# Patient Record
Sex: MALE | Race: BLACK OR AFRICAN AMERICAN | Employment: OTHER | URBAN - METROPOLITAN AREA
[De-identification: names, ages, dates, MRNs, and addresses within clinical notes are randomized per-mention and may not be internally consistent; named-entity substitution may affect disease eponyms.]

---

## 2017-11-06 ENCOUNTER — ANESTHESIA EVENT (OUTPATIENT)
Dept: SURGERY | Age: 32
End: 2017-11-06
Payer: SELF-PAY

## 2017-11-06 ENCOUNTER — APPOINTMENT (OUTPATIENT)
Dept: GENERAL RADIOLOGY | Age: 32
End: 2017-11-06
Attending: ORTHOPAEDIC SURGERY
Payer: SELF-PAY

## 2017-11-06 ENCOUNTER — ANESTHESIA (OUTPATIENT)
Dept: SURGERY | Age: 32
End: 2017-11-06
Payer: SELF-PAY

## 2017-11-06 ENCOUNTER — HOSPITAL ENCOUNTER (OUTPATIENT)
Age: 32
Setting detail: OBSERVATION
Discharge: HOME | End: 2017-11-07
Attending: EMERGENCY MEDICINE | Admitting: ORTHOPAEDIC SURGERY
Payer: SELF-PAY

## 2017-11-06 ENCOUNTER — APPOINTMENT (OUTPATIENT)
Dept: GENERAL RADIOLOGY | Age: 32
End: 2017-11-06
Attending: PHYSICIAN ASSISTANT
Payer: SELF-PAY

## 2017-11-06 DIAGNOSIS — S60.552A ACUTE FOREIGN BODY OF LEFT HAND, INITIAL ENCOUNTER: Primary | ICD-10-CM

## 2017-11-06 DIAGNOSIS — Z23 NEED FOR TDAP VACCINATION: ICD-10-CM

## 2017-11-06 PROBLEM — M79.5 FOREIGN BODY (FB) IN SOFT TISSUE: Status: ACTIVE | Noted: 2017-11-06

## 2017-11-06 LAB
ALBUMIN SERPL-MCNC: 4.2 G/DL (ref 3.4–5)
ALBUMIN/GLOB SERPL: 1.1 {RATIO} (ref 0.8–1.7)
ALP SERPL-CCNC: 91 U/L (ref 45–117)
ALT SERPL-CCNC: 33 U/L (ref 16–61)
ANION GAP SERPL CALC-SCNC: 10 MMOL/L (ref 3–18)
APTT PPP: 29 SEC (ref 23–36.4)
AST SERPL-CCNC: 18 U/L (ref 15–37)
BASOPHILS # BLD: 0 K/UL (ref 0–0.06)
BASOPHILS NFR BLD: 0 % (ref 0–2)
BILIRUB SERPL-MCNC: 0.4 MG/DL (ref 0.2–1)
BUN SERPL-MCNC: 12 MG/DL (ref 7–18)
BUN/CREAT SERPL: 9 (ref 12–20)
CALCIUM SERPL-MCNC: 8.9 MG/DL (ref 8.5–10.1)
CHLORIDE SERPL-SCNC: 103 MMOL/L (ref 100–108)
CO2 SERPL-SCNC: 30 MMOL/L (ref 21–32)
CREAT SERPL-MCNC: 1.31 MG/DL (ref 0.6–1.3)
DIFFERENTIAL METHOD BLD: ABNORMAL
EOSINOPHIL # BLD: 0.2 K/UL (ref 0–0.4)
EOSINOPHIL NFR BLD: 3 % (ref 0–5)
ERYTHROCYTE [DISTWIDTH] IN BLOOD BY AUTOMATED COUNT: 13.5 % (ref 11.6–14.5)
GLOBULIN SER CALC-MCNC: 3.8 G/DL (ref 2–4)
GLUCOSE SERPL-MCNC: 113 MG/DL (ref 74–99)
HCT VFR BLD AUTO: 41.2 % (ref 36–48)
HGB BLD-MCNC: 14.8 G/DL (ref 13–16)
INR PPP: 0.9 (ref 0.8–1.2)
LYMPHOCYTES # BLD: 2.2 K/UL (ref 0.9–3.6)
LYMPHOCYTES NFR BLD: 36 % (ref 21–52)
MCH RBC QN AUTO: 29.2 PG (ref 24–34)
MCHC RBC AUTO-ENTMCNC: 35.9 G/DL (ref 31–37)
MCV RBC AUTO: 81.4 FL (ref 74–97)
MONOCYTES # BLD: 0.5 K/UL (ref 0.05–1.2)
MONOCYTES NFR BLD: 9 % (ref 3–10)
NEUTS SEG # BLD: 3.1 K/UL (ref 1.8–8)
NEUTS SEG NFR BLD: 52 % (ref 40–73)
PLATELET # BLD AUTO: 271 K/UL (ref 135–420)
PMV BLD AUTO: 9.1 FL (ref 9.2–11.8)
POTASSIUM SERPL-SCNC: 3.7 MMOL/L (ref 3.5–5.5)
PROT SERPL-MCNC: 8 G/DL (ref 6.4–8.2)
PROTHROMBIN TIME: 11.9 SEC (ref 11.5–15.2)
RBC # BLD AUTO: 5.06 M/UL (ref 4.7–5.5)
SODIUM SERPL-SCNC: 143 MMOL/L (ref 136–145)
WBC # BLD AUTO: 6 K/UL (ref 4.6–13.2)

## 2017-11-06 PROCEDURE — 74011250636 HC RX REV CODE- 250/636

## 2017-11-06 PROCEDURE — 74011250636 HC RX REV CODE- 250/636: Performed by: ORTHOPAEDIC SURGERY

## 2017-11-06 PROCEDURE — 74011250636 HC RX REV CODE- 250/636: Performed by: ANESTHESIOLOGY

## 2017-11-06 PROCEDURE — 77030002916 HC SUT ETHLN J&J -A

## 2017-11-06 PROCEDURE — 73130 X-RAY EXAM OF HAND: CPT

## 2017-11-06 PROCEDURE — 90471 IMMUNIZATION ADMIN: CPT

## 2017-11-06 PROCEDURE — 99218 HC RM OBSERVATION: CPT

## 2017-11-06 PROCEDURE — 74011000250 HC RX REV CODE- 250

## 2017-11-06 PROCEDURE — 77030000032 HC CUF TRNQT ZIMM -B: Performed by: ORTHOPAEDIC SURGERY

## 2017-11-06 PROCEDURE — 76060000032 HC ANESTHESIA 0.5 TO 1 HR: Performed by: ORTHOPAEDIC SURGERY

## 2017-11-06 PROCEDURE — 74011000250 HC RX REV CODE- 250: Performed by: ORTHOPAEDIC SURGERY

## 2017-11-06 PROCEDURE — 96365 THER/PROPH/DIAG IV INF INIT: CPT

## 2017-11-06 PROCEDURE — 74011250636 HC RX REV CODE- 250/636: Performed by: PHYSICIAN ASSISTANT

## 2017-11-06 PROCEDURE — 85025 COMPLETE CBC W/AUTO DIFF WBC: CPT | Performed by: PHYSICIAN ASSISTANT

## 2017-11-06 PROCEDURE — 85610 PROTHROMBIN TIME: CPT | Performed by: PHYSICIAN ASSISTANT

## 2017-11-06 PROCEDURE — 80053 COMPREHEN METABOLIC PANEL: CPT | Performed by: PHYSICIAN ASSISTANT

## 2017-11-06 PROCEDURE — 90715 TDAP VACCINE 7 YRS/> IM: CPT | Performed by: PHYSICIAN ASSISTANT

## 2017-11-06 PROCEDURE — 77030018836 HC SOL IRR NACL ICUM -A

## 2017-11-06 PROCEDURE — 96375 TX/PRO/DX INJ NEW DRUG ADDON: CPT

## 2017-11-06 PROCEDURE — 99283 EMERGENCY DEPT VISIT LOW MDM: CPT

## 2017-11-06 PROCEDURE — 85730 THROMBOPLASTIN TIME PARTIAL: CPT | Performed by: PHYSICIAN ASSISTANT

## 2017-11-06 PROCEDURE — 76010000138 HC OR TIME 0.5 TO 1 HR: Performed by: ORTHOPAEDIC SURGERY

## 2017-11-06 PROCEDURE — 77030011640 HC PAD GRND REM COVD -A: Performed by: ORTHOPAEDIC SURGERY

## 2017-11-06 PROCEDURE — 76210000006 HC OR PH I REC 0.5 TO 1 HR: Performed by: ORTHOPAEDIC SURGERY

## 2017-11-06 PROCEDURE — 77030002916 HC SUT ETHLN J&J -A: Performed by: ORTHOPAEDIC SURGERY

## 2017-11-06 RX ORDER — NALOXONE HYDROCHLORIDE 0.4 MG/ML
0.1 INJECTION, SOLUTION INTRAMUSCULAR; INTRAVENOUS; SUBCUTANEOUS AS NEEDED
Status: DISCONTINUED | OUTPATIENT
Start: 2017-11-06 | End: 2017-11-06 | Stop reason: HOSPADM

## 2017-11-06 RX ORDER — ONDANSETRON 2 MG/ML
4 INJECTION INTRAMUSCULAR; INTRAVENOUS
Status: COMPLETED | OUTPATIENT
Start: 2017-11-06 | End: 2017-11-06

## 2017-11-06 RX ORDER — SODIUM CHLORIDE 0.9 % (FLUSH) 0.9 %
5-10 SYRINGE (ML) INJECTION AS NEEDED
Status: DISCONTINUED | OUTPATIENT
Start: 2017-11-06 | End: 2017-11-07 | Stop reason: HOSPADM

## 2017-11-06 RX ORDER — DEXTROSE 50 % IN WATER (D50W) INTRAVENOUS SYRINGE
25-50 AS NEEDED
Status: DISCONTINUED | OUTPATIENT
Start: 2017-11-06 | End: 2017-11-06 | Stop reason: HOSPADM

## 2017-11-06 RX ORDER — SODIUM CHLORIDE, SODIUM LACTATE, POTASSIUM CHLORIDE, CALCIUM CHLORIDE 600; 310; 30; 20 MG/100ML; MG/100ML; MG/100ML; MG/100ML
1000 INJECTION, SOLUTION INTRAVENOUS CONTINUOUS
Status: DISCONTINUED | OUTPATIENT
Start: 2017-11-06 | End: 2017-11-06 | Stop reason: HOSPADM

## 2017-11-06 RX ORDER — HYDROCODONE BITARTRATE AND ACETAMINOPHEN 5; 325 MG/1; MG/1
TABLET ORAL
Qty: 10 TAB | Refills: 0 | Status: SHIPPED | OUTPATIENT
Start: 2017-11-06

## 2017-11-06 RX ORDER — CEFAZOLIN SODIUM 2 G/50ML
2 SOLUTION INTRAVENOUS ONCE
Status: COMPLETED | OUTPATIENT
Start: 2017-11-06 | End: 2017-11-06

## 2017-11-06 RX ORDER — MAGNESIUM SULFATE 100 %
4 CRYSTALS MISCELLANEOUS AS NEEDED
Status: DISCONTINUED | OUTPATIENT
Start: 2017-11-06 | End: 2017-11-06 | Stop reason: HOSPADM

## 2017-11-06 RX ORDER — CEFAZOLIN SODIUM 2 G/50ML
2 SOLUTION INTRAVENOUS
Status: COMPLETED | OUTPATIENT
Start: 2017-11-06 | End: 2017-11-06

## 2017-11-06 RX ORDER — BUPIVACAINE HYDROCHLORIDE 2.5 MG/ML
INJECTION, SOLUTION EPIDURAL; INFILTRATION; INTRACAUDAL AS NEEDED
Status: DISCONTINUED | OUTPATIENT
Start: 2017-11-06 | End: 2017-11-06 | Stop reason: HOSPADM

## 2017-11-06 RX ORDER — FENTANYL CITRATE 50 UG/ML
INJECTION, SOLUTION INTRAMUSCULAR; INTRAVENOUS AS NEEDED
Status: DISCONTINUED | OUTPATIENT
Start: 2017-11-06 | End: 2017-11-06 | Stop reason: HOSPADM

## 2017-11-06 RX ORDER — MIDAZOLAM HYDROCHLORIDE 1 MG/ML
INJECTION, SOLUTION INTRAMUSCULAR; INTRAVENOUS AS NEEDED
Status: DISCONTINUED | OUTPATIENT
Start: 2017-11-06 | End: 2017-11-06 | Stop reason: HOSPADM

## 2017-11-06 RX ORDER — LIDOCAINE HYDROCHLORIDE 20 MG/ML
INJECTION, SOLUTION EPIDURAL; INFILTRATION; INTRACAUDAL; PERINEURAL AS NEEDED
Status: DISCONTINUED | OUTPATIENT
Start: 2017-11-06 | End: 2017-11-06 | Stop reason: HOSPADM

## 2017-11-06 RX ORDER — FENTANYL CITRATE 50 UG/ML
50 INJECTION, SOLUTION INTRAMUSCULAR; INTRAVENOUS ONCE
Status: COMPLETED | OUTPATIENT
Start: 2017-11-06 | End: 2017-11-06

## 2017-11-06 RX ORDER — INSULIN LISPRO 100 [IU]/ML
INJECTION, SOLUTION INTRAVENOUS; SUBCUTANEOUS ONCE
Status: DISCONTINUED | OUTPATIENT
Start: 2017-11-06 | End: 2017-11-06 | Stop reason: HOSPADM

## 2017-11-06 RX ORDER — KETOROLAC TROMETHAMINE 30 MG/ML
30 INJECTION, SOLUTION INTRAMUSCULAR; INTRAVENOUS
Status: DISCONTINUED | OUTPATIENT
Start: 2017-11-06 | End: 2017-11-07 | Stop reason: HOSPADM

## 2017-11-06 RX ORDER — LIDOCAINE HYDROCHLORIDE 10 MG/ML
INJECTION INFILTRATION; PERINEURAL AS NEEDED
Status: DISCONTINUED | OUTPATIENT
Start: 2017-11-06 | End: 2017-11-06 | Stop reason: HOSPADM

## 2017-11-06 RX ORDER — HYDROMORPHONE HYDROCHLORIDE 2 MG/ML
0.5 INJECTION, SOLUTION INTRAMUSCULAR; INTRAVENOUS; SUBCUTANEOUS
Status: DISCONTINUED | OUTPATIENT
Start: 2017-11-06 | End: 2017-11-06 | Stop reason: HOSPADM

## 2017-11-06 RX ORDER — SODIUM CHLORIDE, SODIUM LACTATE, POTASSIUM CHLORIDE, CALCIUM CHLORIDE 600; 310; 30; 20 MG/100ML; MG/100ML; MG/100ML; MG/100ML
INJECTION, SOLUTION INTRAVENOUS
Status: DISCONTINUED | OUTPATIENT
Start: 2017-11-06 | End: 2017-11-06 | Stop reason: HOSPADM

## 2017-11-06 RX ORDER — HYDROCODONE BITARTRATE AND ACETAMINOPHEN 5; 325 MG/1; MG/1
1 TABLET ORAL
Status: DISCONTINUED | OUTPATIENT
Start: 2017-11-06 | End: 2017-11-07 | Stop reason: HOSPADM

## 2017-11-06 RX ORDER — SODIUM CHLORIDE 0.9 % (FLUSH) 0.9 %
5-10 SYRINGE (ML) INJECTION EVERY 8 HOURS
Status: DISCONTINUED | OUTPATIENT
Start: 2017-11-06 | End: 2017-11-07 | Stop reason: HOSPADM

## 2017-11-06 RX ORDER — DIPHENHYDRAMINE HCL 25 MG
25 CAPSULE ORAL
Status: DISCONTINUED | OUTPATIENT
Start: 2017-11-06 | End: 2017-11-07 | Stop reason: HOSPADM

## 2017-11-06 RX ORDER — IBUPROFEN 200 MG
800 TABLET ORAL
Qty: 60 TAB | Refills: 1 | Status: SHIPPED | OUTPATIENT
Start: 2017-11-06

## 2017-11-06 RX ORDER — SODIUM CHLORIDE 0.9 % (FLUSH) 0.9 %
5-10 SYRINGE (ML) INJECTION AS NEEDED
Status: DISCONTINUED | OUTPATIENT
Start: 2017-11-06 | End: 2017-11-06 | Stop reason: HOSPADM

## 2017-11-06 RX ORDER — FLUMAZENIL 0.1 MG/ML
0.2 INJECTION INTRAVENOUS
Status: DISCONTINUED | OUTPATIENT
Start: 2017-11-06 | End: 2017-11-06 | Stop reason: HOSPADM

## 2017-11-06 RX ORDER — PROPOFOL 10 MG/ML
INJECTION, EMULSION INTRAVENOUS AS NEEDED
Status: DISCONTINUED | OUTPATIENT
Start: 2017-11-06 | End: 2017-11-06 | Stop reason: HOSPADM

## 2017-11-06 RX ORDER — ONDANSETRON 2 MG/ML
4 INJECTION INTRAMUSCULAR; INTRAVENOUS
Status: DISCONTINUED | OUTPATIENT
Start: 2017-11-06 | End: 2017-11-07 | Stop reason: HOSPADM

## 2017-11-06 RX ORDER — CEFAZOLIN SODIUM 2 G/50ML
2 SOLUTION INTRAVENOUS
Status: DISCONTINUED | OUTPATIENT
Start: 2017-11-06 | End: 2017-11-06 | Stop reason: SDUPTHER

## 2017-11-06 RX ORDER — CEPHALEXIN 250 MG/1
500 CAPSULE ORAL
Status: DISCONTINUED | OUTPATIENT
Start: 2017-11-06 | End: 2017-11-06 | Stop reason: CLARIF

## 2017-11-06 RX ADMIN — KETOROLAC TROMETHAMINE 30 MG: 30 INJECTION, SOLUTION INTRAMUSCULAR at 18:39

## 2017-11-06 RX ADMIN — ONDANSETRON 4 MG: 2 INJECTION INTRAMUSCULAR; INTRAVENOUS at 17:31

## 2017-11-06 RX ADMIN — SODIUM CHLORIDE, SODIUM LACTATE, POTASSIUM CHLORIDE, AND CALCIUM CHLORIDE 1000 ML: 600; 310; 30; 20 INJECTION, SOLUTION INTRAVENOUS at 21:46

## 2017-11-06 RX ADMIN — PROPOFOL 20 MG: 10 INJECTION, EMULSION INTRAVENOUS at 21:00

## 2017-11-06 RX ADMIN — SODIUM CHLORIDE, SODIUM LACTATE, POTASSIUM CHLORIDE, CALCIUM CHLORIDE: 600; 310; 30; 20 INJECTION, SOLUTION INTRAVENOUS at 20:30

## 2017-11-06 RX ADMIN — PROPOFOL 20 MG: 10 INJECTION, EMULSION INTRAVENOUS at 21:08

## 2017-11-06 RX ADMIN — CEFAZOLIN SODIUM 2 G: 2 SOLUTION INTRAVENOUS at 20:36

## 2017-11-06 RX ADMIN — SODIUM CHLORIDE 1000 ML: 900 INJECTION, SOLUTION INTRAVENOUS at 17:30

## 2017-11-06 RX ADMIN — TETANUS TOXOID, REDUCED DIPHTHERIA TOXOID AND ACELLULAR PERTUSSIS VACCINE, ADSORBED 0.5 ML: 5; 2.5; 8; 8; 2.5 SUSPENSION INTRAMUSCULAR at 16:07

## 2017-11-06 RX ADMIN — MIDAZOLAM HYDROCHLORIDE 2 MG: 1 INJECTION, SOLUTION INTRAMUSCULAR; INTRAVENOUS at 20:36

## 2017-11-06 RX ADMIN — FENTANYL CITRATE 50 MCG: 50 INJECTION, SOLUTION INTRAMUSCULAR; INTRAVENOUS at 20:43

## 2017-11-06 RX ADMIN — CEFAZOLIN SODIUM 2 G: 2 SOLUTION INTRAVENOUS at 17:38

## 2017-11-06 RX ADMIN — FENTANYL CITRATE 50 MCG: 50 INJECTION, SOLUTION INTRAMUSCULAR; INTRAVENOUS at 17:21

## 2017-11-06 RX ADMIN — PROPOFOL 50 MG: 10 INJECTION, EMULSION INTRAVENOUS at 20:51

## 2017-11-06 RX ADMIN — LIDOCAINE HYDROCHLORIDE 60 MG: 20 INJECTION, SOLUTION EPIDURAL; INFILTRATION; INTRACAUDAL; PERINEURAL at 20:51

## 2017-11-06 NOTE — ANESTHESIA PREPROCEDURE EVALUATION
Anesthetic History   No history of anesthetic complications            Review of Systems / Medical History  Patient summary reviewed, nursing notes reviewed and pertinent labs reviewed    Pulmonary  Within defined limits                 Neuro/Psych   Within defined limits           Cardiovascular  Within defined limits                Exercise tolerance: >4 METS     GI/Hepatic/Renal                Endo/Other  Within defined limits           Other Findings              Physical Exam    Airway             Cardiovascular               Dental         Pulmonary                 Abdominal  GI exam deferred       Other Findings            Anesthetic Plan    ASA: 1, emergent  Anesthesia type: MAC and general - backup - supraclavicular block          Induction: Intravenous  Anesthetic plan and risks discussed with: Patient      General, mac and block  discussed.  Since it is all soft tissue we will do mac

## 2017-11-06 NOTE — IP AVS SNAPSHOT
Summary of Care Report The Summary of Care report has been created to help improve care coordination. Users with access to [employer] or [address] (Web-based application) may access additional patient information including the Discharge Summary. If you are not currently a [address] user and need more information, please call the number listed below in the [place] [address] section and ask to be connected with Medical Records. Facility Information Name Address Phone [address] [place], [place] [address] [phone] Patient Information Patient Name Sex  Bertin Ellsworth ([id number]) Male 1985 Discharge Information Admitting Provider Service Area Unit Eugene Washington MD / [phone] [address] / [phone] Discharge Provider Discharge Date/Time Discharge Disposition Destination (none) 2017 (Pending) AHR (none) Patient Language Language ENGLISH [13] Hospital Problems as of 2017  Reviewed: 2017  7:08 PM by Candy Tejeda CRNA Class Noted - Resolved Last Modified POA Active Problems Need for Tdap vaccination  2017 - Present 2017 by Homero Martinez PA-C Unknown Entered by Homero Martinez PA-C Acute foreign body of left hand  2017 - Present 2017 by Homero Martinez PA-C Unknown Entered by Homero Martinez PA-C Foreign body (FB) in soft tissue  2017 - Present 2017 by Eugene Washington MD Unknown Entered by Eugene Washington MD  
  
Non-Hospital Problems as of 2017  Reviewed: 2017  7:08 PM by Candy Tejeda CRNA None You are allergic to the following No active allergies Current Discharge Medication List  
  
START taking these medications Dose & Instructions Dispensing Information Comments HYDROcodone-acetaminophen 5-325 mg per tablet Commonly known as:  NORCO  
 1-2 tabs by mouth every 4-6 hours as needed for pain Quantity:  10 Tab Refills:  0  
   
 ibuprofen 200 mg tablet Commonly known as:  MOTRIN Dose:  800 mg Take 4 Tabs by mouth every eight (8) hours as needed for Pain. Quantity:  60 Tab Refills:  1 Current Immunizations Name Date Tdap 2017 Surgery Information ID Date/Time Status Primary Surgeon All Procedures Location [id number] 2017 Unposted Lydia Flores MD FOREIGN BODY REMOVAL LEFT HAND W/C-ARM [facility] MAIN OR Follow-up Information Follow up With Details Comments Contact Info None   None (395) Patient stated that they have no PCP [facility] Patient Discharge Instructions Wayne Adlerviktoriya / [id number] : 1985 Admitted 2017 Discharged: 2017 IF YOU HAVE ANY PROBLEMS ONCE YOU ARE AT HOME CALL THE FOLLOWING NUMBERS:  
Main office number: [phone] Any issues call the office at [phone]. Take Home Medications · Resume your home medictions as directed · A prescription for pain medication has been given · It is important that you take the medication exactly as they are prescribed. · Keep your medication in the bottles provided by the pharmacist and keep a list of the medication names, dosages, and times to be taken in your wallet. · Do not take other medications without consulting your doctor. · Note:  If you have already received and/or filled a prescription for one or more of the medications you've received a prescription for when leaving the hospital, you may disregard the duplicate prescription. What to do at [facility] Resume your prehospital diet. If you have excessive nausea or vomitting call your doctor's office Elevate the hand Keep incision DRY. Leave surgical dressing in place for 7-10 days, then seek medical attention for suture removal.  You may place a large bandaid over the incision to keep covered and dry after 7 days. When to Call - Call if you have a temperature greater then 101 
- Unable to keep food down - Need a pain medication refill Information obtained by : 
I understand that if any problems occur once I am at home I am to contact my physician. I understand and acknowledge receipt of the instructions indicated above. Physician's or R.N.'s Signature                                                                  Date/Time Patient or Representative Signature                                                          Date/Time Chart Review Routing History No Routing History on File

## 2017-11-06 NOTE — Clinical Note
Patient Class[de-identified] Observation [847] Type of Bed: Orthopedics [13] Reason for Observation: retained FB in left hand Admitting Diagnosis: Acute foreign body of left hand, initial encounter [6969992] Admitting Diagnosis: Need for Tdap vaccination [7681323] Admitting Physician: Jolie Gregorio Attending Physician: Belinda Gauthier [[id number]]

## 2017-11-06 NOTE — ED NOTES
Pt resting in stretcher, surgery time pending. IV fluids and abx infusing. Pt reports pain as 2/10. Friend at bedside.

## 2017-11-06 NOTE — PROGRESS NOTES
Pt with foreign body in hand - unsuccessful attempts at removal by ED. To OR this evening for surgical removal.  Last po intake at noon per ED. Plan for surgery around 8 pm.  Full H&P to follow.

## 2017-11-06 NOTE — PROGRESS NOTES
1828Received client from ED in satisfactory condition. Client is A/O X 4. Client is calm and cooperative. Skin is warm , dry with small wound to palm of left hand and skin color is appropriate to race. Bibasilar breath sounds clear. Bowel sounds active . Abdomen is soft and non-tender. Client is NPO for surgery planned at 2000, pre op checklist done by ED nurse. Client has 20gauge IV present in [address] and running NS. Clients pain is 8/10 on 0-10 scale. Client oriented to call bell use as well as bed use. Call bell within reach. Bed in low position. Three side rails up. 1839 Pt state pain as 8/10. Pt received medication as per MAR. Potential medication side effects explained to patient, patient verbalizes understanding, opportunities for questions provided. Patient stable, no apparent distress at this time, bed in locked position, call bell within reach.

## 2017-11-06 NOTE — ED NOTES
Pt ready for IP transfer. Pt with underwear still on, pt verbalizes understanding to remove clothes prior to surgery. IV fluids abx infusing.

## 2017-11-06 NOTE — ROUTINE PROCESS
TRANSFER - IN REPORT:    Verbal report received from Jareth AGRN(name) on [employer]  being received from Emergency(unit) for ordered procedure      Report consisted of patients Situation, Background, Assessment and   Recommendations(SBAR). Information from the following report(s) SBAR, Kardex, MAR and Accordion was reviewed with the receiving nurse. Opportunity for questions and clarification was provided.

## 2017-11-06 NOTE — IP AVS SNAPSHOT
[address] [place] [id number] 
[phone] Patient: Danya Espinal MRN: [id number] JET:[id number] About your hospitalization You were admitted on:  2017 You last received care in the:  [facility] [address] You were discharged on:  2017 Why you were hospitalized Your primary diagnosis was:  Not on File Your diagnoses also included:  Need For Tdap Vaccination, Acute Foreign Body Of Left Hand, Foreign Body (Fb) In Soft Tissue Things You Need To Do (next 8 weeks) Follow up with None Where:  None (395) Patient stated that they have no PCP Discharge Orders None A check serafin indicates which time of day the medication should be taken. My Medications TAKE these medications as instructed Instructions Each Dose to Equal  
 Morning Noon Evening Bedtime HYDROcodone-acetaminophen 5-325 mg per tablet Commonly known as:  Ivstew Velasquezing Your last dose was: Your next dose is:    
   
   
 1-2 tabs by mouth every 4-6 hours as needed for pain  
     
   
   
   
  
 ibuprofen 200 mg tablet Commonly known as:  MOTRIN Your last dose was: Your next dose is: Take 4 Tabs by mouth every eight (8) hours as needed for Pain. 800 mg Where to Get Your Medications Information on where to get these meds will be given to you by the nurse or doctor. ! Ask your nurse or doctor about these medications HYDROcodone-acetaminophen 5-325 mg per tablet  
 ibuprofen 200 mg tablet Dis[facility] Instructions [facility] Patient Discharge Instructions Danya Espinal / [id number] : 1985 Admitted 2017 Discharged: 2017 IF YOU HAVE ANY PROBLEMS ONCE YOU ARE AT HOME CALL THE FOLLOWING NUMBERS:  
Main office number: [phone] Any issues call the office at [phone]. Take Home Medications · Resume your home medictions as directed · A prescription for pain medication has been given · It is important that you take the medication exactly as they are prescribed. · Keep your medication in the bottles provided by the pharmacist and keep a list of the medication names, dosages, and times to be taken in your wallet. · Do not take other medications without consulting your doctor. · Note:  If you have already received and/or filled a prescription for one or more of the medications you've received a prescription for when leaving the hospital, you may disregard the duplicate prescription. What to do at [facility] Resume your prehospital diet. If you have excessive nausea or vomitting call your doctor's office Elevate the hand Keep incision DRY. Leave surgical dressing in place for 7-10 days, then seek medical attention for suture removal.  You may place a large bandaid over the incision to keep covered and dry after 7 days. When to Call - Call if you have a temperature greater then 101 
- Unable to keep food down - Need a pain medication refill Information obtained by : 
I understand that if any problems occur once I am at home I am to contact my physician. I understand and acknowledge receipt of the instructions indicated above. Physician's or R.N.'s Signature                                                                  Date/Time Patient or Representative Signature                                                          Date/Time [facility] Announcement We are excited to announce that we are making your provider's discharge notes available to you in [employer]. You will see these notes when they are completed and signed by the physician that discharged you from your recent hospital stay. If you have any questions or concerns about any information you see in [employer], please call the Health Information Department where you were seen or reach out to your Primary Care Provider for more information about your plan of care. Introducing [facility]! Claudette Sandoval introduces [employer] patient portal. Now you can access parts of your medical record, email your doctor's office, and request medication refills online. 1. In your internet browser, go to [email or web address] 2. Click on the First Time User? Click Here link in the Sign In box. You will see the New Member Sign Up page. 3. Enter your [employer] Access Code exactly as it appears below. You will not need to use this code after youve completed the sign-up process. If you do not sign up before the expiration date, you must request a new code. · [employer] Access Code: [email or web address] Expires: 2/5/2018  7:49 AM 
 
4. Enter the last four digits of your Social Security Number (xxxx) and Date of Birth (mm/dd/yyyy) as indicated and click Submit. You will be taken to the next sign-up page. 5. Create a [employer] ID. This will be your [employer] login ID and cannot be changed, so think of one that is secure and easy to remember. 6. Create a [employer] password. You can change your password at any time. 7. Enter your Password Reset Question and Answer. This can be used at a later time if you forget your password. 8. Enter your e-mail address. You will receive e-mail notification when new information is available in [address] 9. Click Sign Up. You can now view and download portions of your medical record.  
10. Click the Download Summary menu link to download a portable copy of your medical information. If you have questions, please visit the Frequently Asked Questions section of the [employer] website. Remember, [employer] is NOT to be used for urgent needs. For medical emergencies, dial 911. Now available from your iPhone and Android! Unresulted Labs-Please follow up with your PCP about these lab tests Order Current Status [place] [facility] [employer] [employer] In process Providers Seen During Your Hospitalization Provider Specialty Primary office phone Paolo Wilder MD Emergency Medicine [phone] Kourtney Mari MD Orthopedic Surgery [phone] Immunizations Administered for This Admission Name Date Tdap 11/6/2017 Your Primary Care Physician (PCP) Primary Care Physician Office Phone Office Fax NONE ** None ** ** None ** You are allergic to the following No active allergies Recent Documentation Height Weight BMI Smoking Status 1.753 m 79.4 kg 25.84 kg/m2 Never Assessed Emergency Contacts Name Discharge Info Relation Home Work Mobile Tian Solis [2] Other Relative [6]   [phone] Patient Belongings The following personal items are in your possession at time of discharge: 
  Dental Appliances: None         Home Medications: None   Jewelry: None  Clothing: None    Other Valuables: None Please provide this summary of care documentation to your next provider. Signatures-by signing, you are acknowledging that this After Visit Summary has been reviewed with you and you have received a copy. Patient Signature:  ____________________________________________________________ Date:  ____________________________________________________________  
  
Crissy Hager Provider Signature:  ____________________________________________________________ Date:  ____________________________________________________________

## 2017-11-06 NOTE — IP AVS SNAPSHOT
[address] [address] 
[phone] Patient: Katherine Centeno MRN: [id number] ITT:[id number]/9290 My Medications TAKE these medications as instructed Instructions Each Dose to Equal  
 Morning Noon Evening Bedtime HYDROcodone-acetaminophen 5-325 mg per tablet Commonly known as:  Fatmata Nuñez Your last dose was: Your next dose is:    
   
   
 1-2 tabs by mouth every 4-6 hours as needed for pain  
     
   
   
   
  
 ibuprofen 200 mg tablet Commonly known as:  MOTRIN Your last dose was: Your next dose is: Take 4 Tabs by mouth every eight (8) hours as needed for Pain. 800 mg Where to Get Your Medications Information on where to get these meds will be given to you by the nurse or doctor. ! Ask your nurse or doctor about these medications HYDROcodone-acetaminophen 5-325 mg per tablet  
 ibuprofen 200 mg tablet

## 2017-11-06 NOTE — ROUTINE PROCESS
TRANSFER - OUT REPORT:    Verbal report given to LAUREN Ang RN on [address]  being transferred to [employer] for routine progression of care       Report consisted of patients Situation, Background, Assessment and   Recommendations(SBAR). Information from the following report(s) SBAR, ED Summary, [facility] and Recent Results was reviewed with the receiving nurse. Lines:   Peripheral IV 11/06/17 Right Antecubital (Active)   Site Assessment Clean, dry, & intact 11/6/2017  5:29 PM   Phlebitis Assessment 0 11/6/2017  5:29 PM   Infiltration Assessment 0 11/6/2017  5:29 PM   Dressing Status Clean, dry, & intact 11/6/2017  5:29 PM   Dressing Type Transparent 11/6/2017  5:29 PM   Hub Color/Line Status Patent; Flushed 11/6/2017  5:29 PM   Action Taken Blood drawn 11/6/2017  5:29 PM   Alcohol Cap Used Yes 11/6/2017  5:29 PM        Opportunity for questions and clarification was provided.       Patient transported with:   [employer]

## 2017-11-06 NOTE — ED NOTES
Spoke with Surgical who stated that pt's surgical time won't be until 2000 due to NPO status. Approval from nursing supervisor to admit pt to ortho until surgery.

## 2017-11-07 VITALS
WEIGHT: 175 LBS | OXYGEN SATURATION: 100 % | BODY MASS INDEX: 25.92 KG/M2 | TEMPERATURE: 98 F | SYSTOLIC BLOOD PRESSURE: 109 MMHG | RESPIRATION RATE: 16 BRPM | DIASTOLIC BLOOD PRESSURE: 73 MMHG | HEIGHT: 69 IN | HEART RATE: 63 BPM

## 2017-11-07 PROCEDURE — 99218 HC RM OBSERVATION: CPT

## 2017-11-07 PROCEDURE — 74011250637 HC RX REV CODE- 250/637: Performed by: ORTHOPAEDIC SURGERY

## 2017-11-07 RX ADMIN — HYDROCODONE BITARTRATE AND ACETAMINOPHEN 1 TABLET: 5; 325 TABLET ORAL at 05:11

## 2017-11-07 RX ADMIN — Medication 10 ML: at 06:00

## 2017-11-07 NOTE — DISCHARGE INSTRUCTIONS
[facility]    Patient Discharge Instructions    Baron Ortiz / [id number] : 1985    Admitted 2017 Discharged: 2017     IF YOU HAVE ANY PROBLEMS ONCE YOU ARE AT HOME CALL THE FOLLOWING NUMBERS:   Main office number: [phone]    Any issues call the office at [phone]. Take Home Medications     · Resume your home medictions as directed  · A prescription for pain medication has been given   · It is important that you take the medication exactly as they are prescribed. · Keep your medication in the bottles provided by the pharmacist and keep a list of the medication names, dosages, and times to be taken in your wallet. · Do not take other medications without consulting your doctor. · Note:  If you have already received and/or filled a prescription for one or more of the medications you've received a prescription for when leaving the hospital, you may disregard the duplicate prescription. What to do at [address] [place] [address] Ave your prehospital diet. If you have excessive nausea or vomitting call your doctor's office     Elevate the hand    Keep incision DRY. Leave surgical dressing in place for 7-10 days, then seek medical attention for suture removal.  You may place a large bandaid over the incision to keep covered and dry after 7 days. When to Call    - Call if you have a temperature greater then 101  - Unable to keep food down  - Need a pain medication refill     Information obtained by :  I understand that if any problems occur once I am at home I am to contact my physician. I understand and acknowledge receipt of the instructions indicated above.                                                                                                                                            Physician's or R.N.'s Signature                                                                  Date/Time Patient or Representative Signature                                                          Date/Time

## 2017-11-07 NOTE — ANESTHESIA POSTPROCEDURE EVALUATION
Post-Anesthesia Evaluation and Assessment    Cardiovascular Function/Vital Signs  Visit Vitals    /66    Pulse (!) 59    Temp 36.3 °C (97.4 °F)    Resp 9    Ht 5' 9\" (1.753 m)    Wt 79.4 kg (175 lb)    SpO2 100%    BMI 25.84 kg/m2       Patient is status post Procedure(s):  FOREIGN BODY REMOVAL LEFT HAND W/C-ARM. Nausea/Vomiting: Controlled. Postoperative hydration reviewed and adequate. Pain:  Pain Scale 1: Numeric (0 - 10) (11/06/17 2144)  Pain Intensity 1: 0 (11/06/17 2144)   Managed. Neurological Status:   Neuro (WDL): Within Defined Limits (11/06/17 2142)   At baseline. Mental Status and Level of Consciousness: Arousable. Pulmonary Status:   O2 Device: Room air (11/06/17 2142)   Adequate oxygenation and airway patent. Complications related to anesthesia: None    Post-anesthesia assessment completed. No concerns. Patient has met all discharge requirements.     Signed By: Candy Tejeda CRNA    November 6, 2017

## 2017-11-07 NOTE — PROGRESS NOTES
18- Assumed care of patient at this time. Report received from [address] [place] [address]., RN. Patient in bed. Pain 2/10. Call bell left within reach. 0800 - Patient in bed at this time. A/O x 4. Lungs clear, radial pulses present ,  abdomen soft and non-distended. Bowel sounds active, 20 G IV to left AC, INT at this time. No signs of phlebitis or infiltration noted. Patient up at martin, ambulating around room. Skin warm and dry  with  Gauze and ACE wrap to left hand CDI. Patient denies pain or discomfort at this time. Bed placed in lowest position, call bell within reach. 0164 This writer has reviewed discharge instructions with patient at this time. Patient has verbalized understanding. Patient was provided with care notes to include side effects of RX's. IV removed, patient tolerated well. Arm bands removed and shredded. AVS were reviewed with Lorne Fuentes RN.

## 2017-11-07 NOTE — DISCHARGE SUMMARY
[facility] [place] [address]     DISCHARGE SUMMARY     PATIENT: Garrett Lopez     MRN: [id number]   ADMIT DATE: 2017   BILLIN   DISCHARGE DATE:  17     ATTENDING: Shellie Pardo MD   DICTATING: Samy Saravia MD     ADMISSION DIAGNOSIS: Acute foreign body of left hand, initial encounter  Need for Tdap vaccination  Foreign body (FB) in soft tissue  FOREIGN BODY LEFT HAND    DISCHARGE DIAGNOSIS: Status post FOREIGN BODY LEFT HAND    HISTORY OF PRESENT ILLNESS: The patient is a 34y.o. year-old male   with left hand pain secondary to accident with a screw gun on the day of admission. Attempt in ED to removed the screw was unsuccessful. The patient has at this time opted for surgical intervention. PAST MEDICAL HISTORY: History reviewed. No pertinent past medical history. PAST SURGICAL HISTORY:   Past Surgical History:   Procedure Laterality Date    HX ORTHOPAEDIC      right hip replacement       ALLERGIES: No Known Allergies     CURRENT MEDICATIONS:  A list of medications prior to the time of admission include:  Prior to Admission medications    Medication Sig Start Date End Date Taking? Authorizing Provider   ibuprofen (MOTRIN) 200 mg tablet Take 4 Tabs by mouth every eight (8) hours as needed for Pain. 17  Yes Samy Saravia MD   HYDROcodone-acetaminophen [facility]) 5-325 mg per tablet 1-2 tabs by mouth every 4-6 hours as needed for pain 17  Yes Samy Saravia MD       FAMILY HISTORY: History reviewed. No pertinent family history.     SOCIAL HISTORY:   Social History     Social History    Marital status:      Spouse name: N/A    Number of children: N/A    Years of education: N/A     Social History Main Topics    Smoking status: None    Smokeless tobacco: None    Alcohol use None    Drug use: None    Sexual activity: Not Asked     Other Topics Concern    None     Social History Narrative    None       REVIEW OF SYSTEMS: All review of systems are negative. PHYSICAL EXAMINATION: For a detailed physical exam, please refer to the patient's chart. HOSPITAL COURSE: The patient was taken to surgery the day of admission. he underwent left hand foreign body removal and I&D. Operative course was benign. Estimated blood loss was minimal. The patient was taken to the PACU in stable condition and was later taken to the floor in stable condition. During his hospital stay, the patient progressed well.  his pain has been well controlled with oral pain medications. his vitals have remained stable. he has also remained hemodynamically stable. DISCHARGE INSTRUCTIONS: The patient is to be transferred to home. he is to continue on his prior medications per the medication reconciliation form, to which we will add:  1. Ibuprofen 800 mg po every 8 hours prn pain  2. Norco 5/325 mg; 1-2 tablets p.o. every 4 to 6 hours as needed for pain    The patient is to continue to keep his incision dry.   The patient is to followup with PCP or urgent care in home [facility] in the office approximately 7-10 days for suture removal.    Glo Mendosa MD  52/4/80156:38 PM

## 2017-11-07 NOTE — PROGRESS NOTES
2230: patient questioning about being able to go home tonight. Patient stated not having any pain at this time. Left hand elevated on blanket. Vital signs stable. Patient given lunch box, denies any nausea. 2235: Dr. Robert Leone called for clarification on discharging patient. MD stated patient can leave tonight or stay until the morning since he's a 23 hr observation. 2340: Assessment done, patient resting in bed, talking on the phone. Elastic wrap to left hand, elevated on a blanket. Patient denies any pain. Patient stated staying until the morning. Ate lunch box ok, no nausea. No SOB, no chest pain, no distress. VSS.     0330: Patient resting quietly in bed, no distress. 4417: patient up ad martin, ambulated to the bathroom. Patient medicated for pain per MAR. Pain 6-7/10 to left hand. 0720: patient instructed on how to order meals, encouraged to sit up to chair. Pain 2/10.

## 2017-11-07 NOTE — ROUTINE PROCESS
Bedside shift change report given to Angelique Lim (oncoming nurse) by Miesha TURNER RN (offgoing nurse). Report included the following information SBAR, Kardex and MAR.

## 2017-11-07 NOTE — PERIOP NOTES
TRANSFER - OUT REPORT:    Verbal report given to ZHANNA Gudino on [facility]  being transferred to [facility] (unit) for routine post - op       Report consisted of patients Situation, Background, Assessment and   Recommendations(SBAR). Information from the following report(s) SBAR, Intake/Output and MAR was reviewed with the receiving nurse. Lines:   Peripheral IV 11/06/17 Right Antecubital (Active)   Site Assessment Clean, dry, & intact 11/6/2017  9:52 PM   Phlebitis Assessment 0 11/6/2017  9:52 PM   Infiltration Assessment 0 11/6/2017  9:52 PM   Dressing Status Clean, dry, & intact 11/6/2017  9:52 PM   Dressing Type Tape 11/6/2017  9:52 PM   Hub Color/Line Status Infusing 11/6/2017  9:52 PM   Action Taken Blood drawn 11/6/2017  5:29 PM   Alcohol Cap Used Yes 11/6/2017  5:29 PM        Opportunity for questions and clarification was provided. Patient transported with:   Registered Nurse   Prescriptions x2 on the chart for home use.

## 2017-11-07 NOTE — PERIOP NOTES
Dr. Gretchen Cunningham spoke with the patient briefly at the bedside and left prescriptions on the chart for discharge. Dr. Gretchen Cunningham called the patients family member left voice message.

## 2017-11-07 NOTE — H&P
H&P Note    Assessment:   34 y.o. male admitted on 11/6/2017 for Acute foreign body of left hand, initial encounter  Need for Tdap vaccination  Foreign body (FB) in soft tissue  FOREIGN BODY LEFT HAND    Plan:     -Analgesia  -consent obtained for foreign body removal, I&D  -[place] - FU with PCP or urgent care for suture removal in 7-10 days  -Call my office if any issues: [phone]    Subjective:     I was asked by ED staff to evaluate ADÁN OLSEN for hand pain after a screw gun slipped and the screw lodged in his hand. He  is a 34 y.o. male admitted on 11/6/2017 for Acute foreign body of left hand, initial encounter  Need for Tdap vaccination  Foreign body (FB) in soft tissue  FOREIGN BODY LEFT HAND. Attempts to remove the screw in the ER were unsuccessful. Pt reports he noted no sensory deficits prior to attempted ER staff screw removal.  Reports decreased sensation of radial thumb and radial index finger currently. Swelling of thenar eminence along with open wound of thenar eminence    No Known Allergies    History reviewed. No pertinent past medical history. Past Surgical History:   Procedure Laterality Date    HX ORTHOPAEDIC      right hip replacement     History reviewed. No pertinent family history. Review of Systems:  General--Negative for fatigue, weight loss, anorexia  Cardiovascular--Negative for CP, orthopnea, PND  Endocrine--Negative for polyuria, polydipsia, cold intolerance    Vitals:    11/06/17 1745 11/06/17 1800 11/06/17 1829 11/06/17 1953   BP: 128/82 143/64 139/74 114/75   Pulse:  73 67 65   Resp:  16 16 16   Temp:  98.6 °F (37 °C) 98.6 °F (37 °C) 98 °F (36.7 °C)   SpO2: 96% 96% 98%    Weight:       Height:           Physical Exam: General Appearance: Alert and Oriented x3. No acute distress. Conversant. Age-appropriate. Normal mood and affect. Normal inspiratory and expiratory effort.     Musculoskeletal: left hand: decreased sensation of radial thumb and radial index finger. Swelling of thenar eminence along with open wound (about 6 mm x 6 mm with sang drainage) of thenar eminence. Otherwise neurovascularly intact - finger ROM intact but decreased due to pain and swelling      Recent Labs      11/06/17   1726   WBC  6.0   HCT  41.2   MCV  81.4     Recent Labs      11/06/17   1726   NA  143   CO2  30   BUN  12   INR  0.9     Recent Labs      11/06/17   1726   AGRAT  1.1     No results for input(s): UGLU in the last 72 hours. No lab exists for component: SOURCEUR, UBILIRUBIN, UKET, USPG, UOCB, UPH, UPSC, UUROBILISQ, UNITR, URINELEUKOC  @ZGLUCOSEBEDSIDE(glupoc:8)@    Radiographs:    FINDINGS: AP, lateral, and oblique views of the left hand were obtained. Metallic screw obliquely extends through the thenar eminence soft tissues. There  is no abutment or involvement of adjacent first and second metacarpals. No acute  fracture or dislocation. No joint effusion. IMPRESSION: No acute osseous abnormality. Metallic screw within the left thenar  eminence soft tissues.     Nabil Doe MD

## 2017-11-07 NOTE — PERIOP NOTES
TRANSFER - IN REPORT:    Verbal report received from ORN & CRNA on Clayton Donohue  being received from OR (unit) for routine post - op      Report consisted of patients Situation, Background, Assessment and   Recommendations(SBAR). Information from the following report(s) SBAR, Intake/Output and MAR was reviewed with the receiving nurse. Opportunity for questions and clarification was provided. Assessment completed upon patients arrival to unit and care assumed.

## 2017-11-07 NOTE — OP NOTES
[address], [phone]    LEFT hand foreign body removal, irrigation and debridement      Patient: Joe Capps MRN: [id number]  SSN: [id number]    YOB: 1985  Age: 34 y.o. Sex: male      Date of Surgery: 11/6/2017   Preoperative Diagnosis: FOREIGN BODY LEFT HAND   Postoperative Diagnosis: FOREIGN BODY LEFT HAND   Location: [facility]  Surgeon: Elissa Jmaes MD  Assistant: Circ-1: Landry Barcenas RN  Circ-2: [facility]  Radiology Technician: Flory Lopez  Scrub Tech-1: Catia Rowe  Scrub RN-1: Yinka Phelps RN, was medically necessary for holding of retractors for exposure and to complete the case. Anesthesia: MAC + Local    Procedure: LEFT hand foreign body removal, irrigation and debridement    Findings: 6 mm x 6 mm wound centrally over volar surface of thenar eminence, 2 cm long screw present in the deep musculature of the thenar eminence with associated small flakes of white-colored foreign debris    Estimated Blood Loss: minimal    Fluids: see anesthesia record    Tourniquet Pressure: 250 mmHg    Tourniquet Time:   Total Tourniquet Time Documented:  Upper Arm (Left) - 19 minutes  Total: Upper Arm (Left) - 19 minutes     Complications: None    Specimens: None    Cultures: None    Antibiotics: 2 g Ancef. Patient Condition: Stable. INDICATIONS: This 34y.o.-year-old male has had an accident on the job today when a power screw  with a screw on it slipped and entered his left palm in the thenar eminence. Attempt to remove the screw in the ED was unsuccessful. The patient has been indicated for surgical removal.  Reports decreased sensation in radial aspect of thumb and index fingers pre-operatively.     The risks and the possible complications of this surgery and anesthesia including, but not exclusive to, bleeding, infection, nerve and vascular injury, possible need for other procedures have been explained to the patient. The patient accepts these risks for the benefits of screw removal.    The patients medical problems have been addressed by anesthesia and are deemed stable and as well controlled as possible. This is a medically necessary procedure. As such, without a use of a surgical assistant to retract soft tissues, protect vital neuro-vascular structures and assist in the technical aspects of the operation, this procedure would not have been possible. Therefore this assistant was medically necessary. DESCRIPTION OF FINDINGS: 6 mm x 6 mm wound centrally over volar surface of thenar eminence, 2 cm long screw present in the deep musculature of the thenar eminence with associated small flakes of white-colored foreign debris    DESCRIPTION OF PROCEDURE:    After the risks and benefits of surgery were discussed, informed consent was obtained. The patient was identified in the preoperative holding area and the operative extremity was marked. The patient was taken to the operating room and placed supine. Sedation was performed. IV antibiotics were given. After verification of the appropriate operative site from the consent form, with confirmation of surgeon, anesthesia and nursing teams; the bony prominences were well padded; and the left arm was prepped and draped in a sterile fashion using betadine. A formal timeout was performed. After gravity exsanguination, an upper arm tourniquet was inflated to 250 mm Hg. 10 cc of 1% lidocaine was injected into the proximal palm in the area of the wound overlying the thenar eminence. The wound was extended about 1 cm in each direction proximally and distally and dissection was made down to the thenar muscle fascia.   The defect in the thenar muscle fascia was identified and retractors were advanced into the muscle defect - along the track the screw had taken there was noted to be some whitish/gray debris - 1 mm x 1mm the smallest and the largest <1 mm wide x 3 mm x 4 mm (this may have represented a chip of paint). Fluoroscopy was used to identify the screw position. After carefully advancing more deeply along the track taken by the screw, the screw was identified - it had a white head (painted white) and was about 2 cm long and about 3.5 mm in core diameter. Once the screw was removed, the wound was copiously irrigated with saline containing bacitracin. The tourniquet was deflated. 10 mL of 0.25% marcaine was infiltrated into the subcutaneous tissue. The skin was then closed with interrupted 3-0 nylon in a vertical mattress fashion. Xeroform was placed followed by a sterile dressing. The patient was awakened from anesthesia. All sponge and needle counts were correct.       Adam Minaya MD

## 2017-11-07 NOTE — ROUTINE PROCESS
Bedside and Verbal shift change report given to NADIA Escudero (oncoming nurse) by Amanda Walter RN (offgoing nurse). Report included the following information SBAR, Kardex and MAR.

## 2017-11-07 NOTE — ROUTINE PROCESS
TRANSFER - OUT REPORT:    Verbal report given to jass liz Rn(name) on [facility]  being transferred to OR(unit) for ordered procedure       Report consisted of patients Situation, Background, Assessment and   Recommendations(SBAR). Information from the following report(s) SBAR, Kardex, ED Summary, MAR and Accordion was reviewed with the receiving nurse. Lines:   Peripheral IV 11/06/17 Right Antecubital (Active)   Site Assessment Clean, dry, & intact 11/6/2017  5:29 PM   Phlebitis Assessment 0 11/6/2017  5:29 PM   Infiltration Assessment 0 11/6/2017  5:29 PM   Dressing Status Clean, dry, & intact 11/6/2017  5:29 PM   Dressing Type Transparent 11/6/2017  5:29 PM   Hub Color/Line Status Patent; Flushed 11/6/2017  5:29 PM   Action Taken Blood drawn 11/6/2017  5:29 PM   Alcohol Cap Used Yes 11/6/2017  5:29 PM        Opportunity for questions and clarification was provided.

## 2019-09-24 PROBLEM — Z23 NEED FOR TDAP VACCINATION: Status: RESOLVED | Noted: 2017-11-06 | Resolved: 2019-09-24

## 2020-01-27 NOTE — ED PROVIDER NOTES
HPI Comments:   3:36 PM    Gonzales Del Rio is a 34 y.o. male presents to ED C/O left hand laceration, onset 30 minutes ago. 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 Last tetanus shot was \"awhile ago. \"  Pt reports FROM of left hand with pain. Pt denies any other Sx or complaints. NKDA. The history is provided by the patient. No  was used. No past medical history on file. Past Surgical History:   Procedure Laterality Date    HX ORTHOPAEDIC      right hip replacement         No family history on file. Social History     Social History    Marital status:      Spouse name: N/A    Number of children: N/A    Years of education: N/A     Occupational History    Not on file. Social History Main Topics    Smoking status: Not on file    Smokeless tobacco: Not on file    Alcohol use Not on file    Drug use: Not on file    Sexual activity: Not on file     Other Topics Concern    Not on file     Social History Narrative    No narrative on file         ALLERGIES: Review of patient's allergies indicates no known allergies. Review of Systems   Musculoskeletal: Positive for arthralgias (left hand) and joint swelling. Negative for myalgias. Skin: Positive for wound (left hand). Allergic/Immunologic: Negative for immunocompromised state. Neurological: Negative for weakness and numbness. Hematological: Does not bruise/bleed easily. Vitals:    11/06/17 1531   BP: 122/80   Pulse: 80   Resp: 16   Temp: 98.9 °F (37.2 °C)   SpO2: 99%   Weight: 79.4 kg (175 lb)   Height: 5' 9\" (1.753 m)            Physical Exam   Constitutional: He is oriented to person, place, and time. He appears well-developed and well-nourished. No distress. AA male in NAD. Alert. Ambulatory. In fast track room. HENT:   Head: Normocephalic and atraumatic.    Right Ear: External ear normal.   Left Ear: External ear normal. 01/26/20  0644   WBC 5.8 6.8 6.2   HGB 11.6* 11.7* 11.0*   PLT See Reflexed IPF Result 173 163  121*     BMP:    Recent Labs     01/26/20  0644 01/27/20  0436    145*   K 4.0 4.1    106   CO2 29 28   BUN 25* 22*   CREATININE 0.35* 0.28*   GLUCOSE 166* 127*     Hepatic:   No results for input(s): AST, ALT, ALB, BILITOT, ALKPHOS in the last 72 hours. Troponin: No results for input(s): TROPONINI in the last 72 hours. BNP: No results for input(s): BNP in the last 72 hours. Lipids:   No results for input(s): CHOL, HDL in the last 72 hours. Invalid input(s): LDLCALCU  INR:   No results for input(s): INR in the last 72 hours. Assessment and Recommendations:     Traumatic subarachnoid hemorrhage.     s/p diagnostic cerebral angiogram in December 26, 2019  1. Critical right cervical ICA stenosis measuring approximately 90 to 99% per NASCET criteria. There is delayed anterograde filling of the cervical ica across the stenosis. There is Collateral filling of the distal right ica from the retrograde filling of the right ophthalmic artery from the right IMAX. In addition there is pial collaterals from the right PCA supplying the distal right mca territory parieto/occipital region. 2. No MCA vasospasm noted however there are irregularities suspected from diffuse intracranial athero including 50% left vert athero with stenosis in the v3 segment    Patient with possible alcohol withdrawal.      S/p cerebral angiogram on January 8  --Right ICA stent was placed with pre-and post balloon angioplasty. Impression: 1. Left cervical stenosis measuring approximately 20% at the carotid ICA bulb with postsurgical changes noted from prior endarterectomy. 2.Diffuse atherosclerotic disease noted in the left cavernous ICA/left RUSTY and?distal?left MCA M1. 3.There is increased vascularity noted in the distal left RUSTY pericallosal branch with early venous drainage of unclear etiology.    4.There is decreased Nose: Nose normal.   Eyes: Conjunctivae are normal.   Neck: Normal range of motion. Cardiovascular: Normal rate, regular rhythm, normal heart sounds and intact distal pulses. Exam reveals no gallop and no friction rub. No murmur heard. Pulses:       Radial pulses are 2+ on the right side, and 2+ on the left side. Pulmonary/Chest: Effort normal and breath sounds normal. No accessory muscle usage. No tachypnea. He has no decreased breath sounds. He has no rhonchi. Musculoskeletal: He exhibits no edema. Left hand: He exhibits tenderness, laceration and swelling. He exhibits normal range of motion, normal capillary refill and no deformity. Normal sensation noted. Normal strength noted. Hands:  Neurological: He is alert and oriented to person, place, and time. Skin: Skin is warm and dry. He is not diaphoretic. Psychiatric: He has a normal mood and affect. Judgment normal.   Nursing note and vitals reviewed. RESULTS:    PULSE OXIMETRY NOTE:  Pulse-ox is 99% on RA  Interpretation: normal       XR HAND LT MIN 3 V   Final Result     IMPRESSION: No acute osseous abnormality. Metallic screw within the left thenar  eminence soft tissues. As read by the radiologist.        4:08 PM  RADIOLOGY FINDINGS  Left hand X-ray shows Retained radio opaque foreign body in left hand consistent with a screw  Pending review by Radiologist  Recorded by Sarai Marvin ED Scribe, as dictated by Monika Wilcox PA-C     Labs Reviewed   CBC WITH AUTOMATED DIFF   METABOLIC PANEL, COMPREHENSIVE   PROTHROMBIN TIME + INR   PTT       No results found for this or any previous visit (from the past 12 hour(s)). MDM  Number of Diagnoses or Management Options  Diagnosis management comments: Puncture wound to left hand. NVI. Will image wound. Update Tdap. ABX.         Amount and/or Complexity of Data Reviewed  Tests in the radiology section of CPT®: ordered and reviewed (XR left hand)  Independent visualization of images, tracings, or specimens: yes (XR left hand)      ED Course     Medications   sodium chloride 0.9 % bolus infusion 1,000 mL (not administered)   ceFAZolin (ANCEF) 1 g in 0.9% sodium chloride (MBP/ADV) 50 mL MBP (not administered)   fentaNYL citrate (PF) injection 50 mcg (not administered)   ondansetron (ZOFRAN) injection 4 mg (not administered)   diph,Pertuss(AC),Tet Vac-PF (BOOSTRIX) suspension 0.5 mL (0.5 mL IntraMUSCular Given 11/6/17 1607)         Foreign Body Removal  Date/Time: 11/6/2017 4:32 PM  Performed by: Barry Lopez  Authorized by: Barry Lopez     Consent:     Consent obtained:  Verbal    Consent given by:  Patient  Location:     Location:  Hand    Hand location:  L palm  Pre-procedure details:     Imaging:  X-ray    Preparation: Patient was prepped and draped in usual sterile fashion    Anesthesia (see MAR for exact dosages): Anesthesia method:  None  Procedure type:     Procedure complexity:  Complex  Procedure details:     Localization method:  Probed    Dissection of underlying tissues: no      Bloodless field: yes      Removal mechanism: Forceps    Foreign bodies recovered:  None    Intact foreign body removal: no    Post-procedure details:     Neurovascular status: intact      Patient tolerance of procedure: Tolerated well, no immediate complications        PROGRESS NOTE:  3:36 PM  Initial assessment performed. Written by Caleb Galo ED Scribe, as dictated by Rhett Arrieta PA-C    CONSULT NOTE:   4:54 PM  Rhett Arrieta PA-C spoke with Shashi Morales. Hassell Dance, MD.  Specialty: Orthopedic Surgery  Discussed pt's hx, disposition, and available diagnostic and imaging results  over the telephone. Reviewed care plans. Consulting physician agrees to admit pt to orthopedics. He will take patient to OR for removal. Last PO was noon per patient. Left hand NVI prior to procedure. Pre-op labs ordered. IV 2G ancef. Tdap. Pain meds. IVF given.        ADMISSION NOTE:  5:02 PM  Patient is being admitted to the hospital by Marlen James. Zackary Bro MD (Orthopedic Surgery) to orthopedics observation and surgical intervention for FB removal. The results of their tests and reasons for their admission have been discussed with them and/or available family. They convey agreement and understanding for the need to be admitted and for their admission diagnosis. Written by Jeff Mckoy, ED Scribe, as dictated by Rolan Dumont PA-C.       CLINICAL IMPRESSION    1. Acute foreign body of left hand, initial encounter    2. Need for Tdap vaccination         PLAN: ADMIT TO ORTHOPEDICS OBSERVATION    ATTESTATIONS:  This note is prepared by Jeff Mckoy, acting as Scribe for Rolan Dumont PA-C. Rolan Dumont PA-C: The scribe's documentation has been prepared under my direction and personally reviewed by me in its entirety. I confirm that the note above accurately reflects all work, treatment, procedures, and medical decision making performed by me.

## (undated) DEVICE — ZIMMER® STERILE DISPOSABLE TOURNIQUET CUFF WITH PROTECTIVE SLEEVE AND PLC, SINGLE PORT, SINGLE BLADDER, 18 IN. (46 CM)

## (undated) DEVICE — (D)SYR 10ML 1/5ML GRAD NSAF -- PKGING CHANGE USE ITEM 338027

## (undated) DEVICE — SHEET,DRAPE,40X58,STERILE: Brand: MEDLINE

## (undated) DEVICE — SYRINGE BLB 50CC IRRIG PLIABLE FNGR FLNG GRAD FLSK DISP

## (undated) DEVICE — 3L THIN WALL CAN: Brand: CRD

## (undated) DEVICE — BANDAGE COMPR 9 FTX4 IN SMOOTH COMFORTABLE SYNTH ESMRK LF

## (undated) DEVICE — UNDERCAST PADDING: Brand: DEROYAL

## (undated) DEVICE — DEVON™ KNEE AND BODY STRAP 60" X 3" (1.5 M X 7.6 CM): Brand: DEVON

## (undated) DEVICE — MASTISOL ADHESIVE LIQ 2/3ML

## (undated) DEVICE — APPLICATOR FBR TIP L6IN COT TIP WOOD SHFT SWAB 2000 PER CA

## (undated) DEVICE — SKIN MARKER,REGULAR TIP WITH RULER AND LABELS: Brand: DEVON

## (undated) DEVICE — REM POLYHESIVE ADULT PATIENT RETURN ELECTRODE: Brand: VALLEYLAB

## (undated) DEVICE — NEEDLE HYPO 25GA L1.5IN BLU POLYPR HUB S STL REG BVL STR

## (undated) DEVICE — BIPOLAR FORCEPS CORD,BANANA LEADS: Brand: VALLEYLAB

## (undated) DEVICE — 3M™ STERI-STRIP™ REINFORCED ADHESIVE SKIN CLOSURES, R1546, 1/4 IN X 4 IN (6 MM X 100 MM), 10 STRIPS/ENVELOPE: Brand: 3M™ STERI-STRIP™

## (undated) DEVICE — SUT ETHLN 3-0 18IN PS2 BLK --

## (undated) DEVICE — MEDI-VAC NON-CONDUCTIVE SUCTION TUBING: Brand: CARDINAL HEALTH

## (undated) DEVICE — BANDAGE COMPR SELF ADH 5 YDX4 IN TAN STRL PREMIERPRO LF

## (undated) DEVICE — SPONGE GZ W4XL4IN COT 12 PLY TYP VII WVN C FLD DSGN

## (undated) DEVICE — PACK PROCEDURE SURG EXTREMITY CUST

## (undated) DEVICE — OCCLUSIVE GAUZE STRIP,3% BISMUTH TRIBROMOPHENATE IN PETROLATUM BLEND: Brand: XEROFORM

## (undated) DEVICE — (D)PREP SKN CHLRAPRP APPL 26ML -- CONVERT TO ITEM 371833